# Patient Record
Sex: MALE | Race: WHITE
[De-identification: names, ages, dates, MRNs, and addresses within clinical notes are randomized per-mention and may not be internally consistent; named-entity substitution may affect disease eponyms.]

---

## 2017-06-19 ENCOUNTER — HOSPITAL ENCOUNTER (OUTPATIENT)
Dept: HOSPITAL 61 - PCVCCLINIC | Age: 82
Discharge: HOME | End: 2017-06-19
Attending: INTERNAL MEDICINE
Payer: MEDICARE

## 2017-06-19 DIAGNOSIS — Z87.891: ICD-10-CM

## 2017-06-19 DIAGNOSIS — E78.5: ICD-10-CM

## 2017-06-19 DIAGNOSIS — Z79.82: ICD-10-CM

## 2017-06-19 DIAGNOSIS — I10: ICD-10-CM

## 2017-06-19 DIAGNOSIS — I77.89: ICD-10-CM

## 2017-06-19 DIAGNOSIS — I25.10: Primary | ICD-10-CM

## 2017-06-19 DIAGNOSIS — R00.1: ICD-10-CM

## 2017-06-19 DIAGNOSIS — Z79.899: ICD-10-CM

## 2017-06-19 DIAGNOSIS — Z88.1: ICD-10-CM

## 2017-06-19 PROCEDURE — G0463 HOSPITAL OUTPT CLINIC VISIT: HCPCS

## 2017-06-19 PROCEDURE — 93005 ELECTROCARDIOGRAM TRACING: CPT

## 2017-06-19 PROCEDURE — 80061 LIPID PANEL: CPT

## 2017-12-18 ENCOUNTER — HOSPITAL ENCOUNTER (OUTPATIENT)
Dept: HOSPITAL 61 - PCVCIMAG | Age: 82
Discharge: HOME | End: 2017-12-18
Attending: INTERNAL MEDICINE
Payer: MEDICARE

## 2017-12-18 DIAGNOSIS — I25.10: ICD-10-CM

## 2017-12-18 DIAGNOSIS — Z79.899: ICD-10-CM

## 2017-12-18 DIAGNOSIS — I10: ICD-10-CM

## 2017-12-18 DIAGNOSIS — I48.91: Primary | ICD-10-CM

## 2017-12-18 DIAGNOSIS — Z87.891: ICD-10-CM

## 2017-12-18 DIAGNOSIS — E78.00: ICD-10-CM

## 2017-12-18 PROCEDURE — 93306 TTE W/DOPPLER COMPLETE: CPT

## 2017-12-18 PROCEDURE — 36415 COLL VENOUS BLD VENIPUNCTURE: CPT

## 2017-12-18 PROCEDURE — 93880 EXTRACRANIAL BILAT STUDY: CPT

## 2017-12-18 NOTE — PCVCIMAG
--------------- APPROVED REPORT --------------





Patient Location: Out-Patient



Indications

Stenosis



Doppler Spectral Velocity Analysis

PSV  /  EDVPSV  /  EDV

ECA (R)     68 / 7 cm/sECA (L)     68 / 9 cm/s



dICA (R)    54 / 19 cm/sdICA (L)     62 / 19 cm/s

[place]ver (R)     70 / 30 cm/smICA (L)     73 / 20 cm/s

pICA (R)     60 / 16 cm/spICA (L)     55 / 22 cm/s



Bulb (R)        53 / 13 cm/sBulb (L)         37 / 8 cm/s



dCCA (R)     48 / 17 cm/sdCCA (L)     50 / 13 cm/s

mCCA (R)    67 / 20 cm/smCCA (L)    66 / 9 cm/s



Vert (R)     43 / 12 cm/sVert (L)     34 / 8 cm/s

ICA/CCA     1.46

ICA/CCA     1.46





Findings

The right carotid bulb has mild calcified plaque.

The right proximal internal carotid artery shows no significant 

stenosis.

The right common carotid artery shows no significant stenosis.

The right external carotid artery shows no significant stenosis.

The left carotid bulb has mild calcified plaque.

The left proximal internal carotid artery shows no significant 

stenosis.

The left common carotid artery shows no significant stenosis.

The left external carotid artery shows no significant 

stenosis.



Conclusion

1.  Mild calcific plaquing involving both carotid arteries without 

significant stenosis

2.  Antegrade vertebral flow

## 2017-12-19 ENCOUNTER — HOSPITAL ENCOUNTER (OUTPATIENT)
Dept: HOSPITAL 61 - PCVCCLINIC | Age: 82
Discharge: HOME | End: 2017-12-19
Attending: INTERNAL MEDICINE
Payer: MEDICARE

## 2017-12-19 DIAGNOSIS — I65.23: ICD-10-CM

## 2017-12-19 DIAGNOSIS — Z79.899: ICD-10-CM

## 2017-12-19 DIAGNOSIS — I10: ICD-10-CM

## 2017-12-19 DIAGNOSIS — Z87.891: ICD-10-CM

## 2017-12-19 DIAGNOSIS — I48.91: Primary | ICD-10-CM

## 2017-12-19 DIAGNOSIS — I25.10: ICD-10-CM

## 2017-12-19 DIAGNOSIS — E78.00: ICD-10-CM

## 2017-12-19 PROCEDURE — G0463 HOSPITAL OUTPT CLINIC VISIT: HCPCS

## 2017-12-19 PROCEDURE — 93005 ELECTROCARDIOGRAM TRACING: CPT

## 2017-12-19 PROCEDURE — 80061 LIPID PANEL: CPT

## 2017-12-19 NOTE — PCVCIMAG
--------------- APPROVED REPORT --------------





Study performed:  12/18/2017 10:50:40



EXAM: Comprehensive 2D, Doppler, and color-flow 

Echocardiogram

Patient Location: Echo lab

Status:  routine



BSA:         1.90

HR: 65 bpmBP:          140/78 mmHg

Rhythm: Atrial Fibrillation



Other Information 

Study Quality: Adequate



Risk Factors: 

Cardiac Risk Factors:  HTN, Hyperlipidemia



Indications

Atrial Fibrillation

CAD

Hypertension/HDD



2D Dimensions

LVEF(%):  50.68 (&gt;50%)

IVSd:  12.26 (7-11mm)LVOT Diam:  17.63 (18-24mm) 

LVDd:  41.61 mm

PWd:  11.22 (7-11mm)Ascending Ao:  28.64 (22-36mm)

LVDs:  31.00 (25-40mm)

Left Atrium:  41.69 (27-40mm)

Aortic Root:  28.95 mm

LV Single Plane 4CH:  50.44 %

LV Single Plane 2CH:  47.76 %Rooney's LVEF:  49.10 %

Biplane EF:  50.2 %



Volumes

Left Atrial Volume (Systole)

Single Plane 4CH:  44.96 mLSingle Plane 2CH:  42.88 mL

LA ESV Index:  32.00 mL/m2



Aortic Valve

AoV Peak Andrea.:  1.17 m/s

AO Peak Gr.:  5.96 mmHgLVOT Max PG:  3.47 mmHg

LVOT Max V:  0.90 m/s

KASANDRA Vmax: 1.89 cm2

AI Vmax:  4.48 m/s

AI [place]:  2.17 m/s2

AI PHT:  634.89 ms



Pulmonary Valve

PV Peak Andrea.:  1.00 m/sPV Peak Gr.:  3.99 mmHg



Tricuspid Valve

TR Peak Andrea.:  2.35 m/sRAP Estimate:  7.00 mmHg

TR Peak Gr.:  22.15 mmHg

PA Pressure:  29.00 mmHg



Left Ventricle

The left ventricle is normal size. There is normal LV segmental wall 

motion. Mild concentric left ventricular hypertrophy. Left 

ventricular systolic function is mildly decreased. LVEF is 50%. This 

study is not technically sufficient to allow evaluation of the LV 

diastolic function due to atrial fibrillation.



Right Ventricle

The right ventricle is normal size. The right ventricular systolic 

function is normal.



Atria

Left atrium is mildly dilated. Right atrium is dilated.



Aortic Valve

The aortic valve is normal in structure. Trace to mild aortic 

regurgitation. There is no aortic valvular stenosis.



Mitral Valve

The mitral valve is normal in structure. Trace mitral regurgitation. 

No evidence of mitral valve stenosis.



Tricuspid Valve

The tricuspid valve is normal in structure. Trace to mild tricuspid 

regurgitation. Pulmonary artery pressure is 29 mmHg.



Pulmonic Valve

The pulmonary valve is normal in structure. There is no pulmonic 

valvular regurgitation.



Great Vessels

The aortic root is normal in size. IVC is normal in size and 

collapses with &gt;50% inspiration



Pericardium

There is no pericardial effusion.



&lt;Conclusion&gt;

The left ventricle is normal size.

Mild concentric left ventricular hypertrophy.

Left ventricular systolic function is mildly decreased.

LVEF is 50%.

This study is not technically sufficient to allow evaluation of the 

LV diastolic function due to atrial fibrillation.

Left atrium is mildly dilated.

Right atrium is dilated.

The aortic valve is normal in structure.

Trace mitral regurgitation.

Trace to mild tricuspid regurgitation. Pulmonary artery pressure is 

29 mmHg.

There is no pericardial effusion.

## 2018-01-16 ENCOUNTER — HOSPITAL ENCOUNTER (OUTPATIENT)
Dept: HOSPITAL 61 - PCVCIMAG | Age: 83
Discharge: HOME | End: 2018-01-16
Attending: INTERNAL MEDICINE
Payer: MEDICARE

## 2018-01-16 DIAGNOSIS — I10: ICD-10-CM

## 2018-01-16 DIAGNOSIS — Z79.899: ICD-10-CM

## 2018-01-16 DIAGNOSIS — Z87.891: ICD-10-CM

## 2018-01-16 DIAGNOSIS — I25.10: Primary | ICD-10-CM

## 2018-01-16 DIAGNOSIS — I48.91: ICD-10-CM

## 2018-01-16 DIAGNOSIS — E78.00: ICD-10-CM

## 2018-01-16 PROCEDURE — 93017 CV STRESS TEST TRACING ONLY: CPT

## 2018-01-16 PROCEDURE — 93005 ELECTROCARDIOGRAM TRACING: CPT

## 2018-01-16 PROCEDURE — 78452 HT MUSCLE IMAGE SPECT MULT: CPT

## 2018-04-26 ENCOUNTER — HOSPITAL ENCOUNTER (OUTPATIENT)
Dept: HOSPITAL 61 - PCVCIMAG | Age: 83
Discharge: HOME | End: 2018-04-26
Attending: INTERNAL MEDICINE
Payer: MEDICARE

## 2018-04-26 ENCOUNTER — HOSPITAL ENCOUNTER (OUTPATIENT)
Dept: HOSPITAL 61 - PCVCCLINIC | Age: 83
Discharge: HOME | End: 2018-04-26
Attending: INTERNAL MEDICINE
Payer: MEDICARE

## 2018-04-26 DIAGNOSIS — I48.1: ICD-10-CM

## 2018-04-26 DIAGNOSIS — R94.31: ICD-10-CM

## 2018-04-26 DIAGNOSIS — E11.65: ICD-10-CM

## 2018-04-26 DIAGNOSIS — I10: Primary | ICD-10-CM

## 2018-04-26 DIAGNOSIS — I08.0: Primary | ICD-10-CM

## 2018-04-26 DIAGNOSIS — R00.2: ICD-10-CM

## 2018-04-26 DIAGNOSIS — I25.10: ICD-10-CM

## 2018-04-26 DIAGNOSIS — E78.00: ICD-10-CM

## 2018-04-26 DIAGNOSIS — R06.02: ICD-10-CM

## 2018-04-26 DIAGNOSIS — K29.61: ICD-10-CM

## 2018-04-26 DIAGNOSIS — I10: ICD-10-CM

## 2018-04-26 DIAGNOSIS — Z79.899: ICD-10-CM

## 2018-04-26 DIAGNOSIS — Z87.891: ICD-10-CM

## 2018-04-26 PROCEDURE — 80061 LIPID PANEL: CPT

## 2018-04-26 PROCEDURE — 36415 COLL VENOUS BLD VENIPUNCTURE: CPT

## 2018-04-26 PROCEDURE — 93005 ELECTROCARDIOGRAM TRACING: CPT

## 2018-04-26 PROCEDURE — 93306 TTE W/DOPPLER COMPLETE: CPT

## 2018-05-04 ENCOUNTER — HOSPITAL ENCOUNTER (INPATIENT)
Dept: HOSPITAL 35 - ER | Age: 83
LOS: 4 days | Discharge: HOME | DRG: 69 | End: 2018-05-08
Attending: INTERNAL MEDICINE | Admitting: INTERNAL MEDICINE
Payer: COMMERCIAL

## 2018-05-04 VITALS — SYSTOLIC BLOOD PRESSURE: 147 MMHG | DIASTOLIC BLOOD PRESSURE: 76 MMHG

## 2018-05-04 VITALS — HEIGHT: 69.02 IN | WEIGHT: 160 LBS | BODY MASS INDEX: 23.7 KG/M2

## 2018-05-04 VITALS — SYSTOLIC BLOOD PRESSURE: 144 MMHG | DIASTOLIC BLOOD PRESSURE: 70 MMHG

## 2018-05-04 VITALS — DIASTOLIC BLOOD PRESSURE: 74 MMHG | SYSTOLIC BLOOD PRESSURE: 140 MMHG

## 2018-05-04 VITALS — DIASTOLIC BLOOD PRESSURE: 77 MMHG | SYSTOLIC BLOOD PRESSURE: 140 MMHG

## 2018-05-04 VITALS — SYSTOLIC BLOOD PRESSURE: 148 MMHG | DIASTOLIC BLOOD PRESSURE: 79 MMHG

## 2018-05-04 DIAGNOSIS — I25.10: ICD-10-CM

## 2018-05-04 DIAGNOSIS — Z95.5: ICD-10-CM

## 2018-05-04 DIAGNOSIS — K57.90: ICD-10-CM

## 2018-05-04 DIAGNOSIS — I48.91: ICD-10-CM

## 2018-05-04 DIAGNOSIS — Z88.5: ICD-10-CM

## 2018-05-04 DIAGNOSIS — N17.0: ICD-10-CM

## 2018-05-04 DIAGNOSIS — I25.2: ICD-10-CM

## 2018-05-04 DIAGNOSIS — Z96.642: ICD-10-CM

## 2018-05-04 DIAGNOSIS — G45.9: Primary | ICD-10-CM

## 2018-05-04 DIAGNOSIS — E78.00: ICD-10-CM

## 2018-05-04 DIAGNOSIS — D50.0: ICD-10-CM

## 2018-05-04 DIAGNOSIS — I10: ICD-10-CM

## 2018-05-04 DIAGNOSIS — Z98.42: ICD-10-CM

## 2018-05-04 DIAGNOSIS — Z87.891: ICD-10-CM

## 2018-05-04 DIAGNOSIS — K80.20: ICD-10-CM

## 2018-05-04 DIAGNOSIS — Z79.899: ICD-10-CM

## 2018-05-04 LAB
ALBUMIN SERPL-MCNC: 3.5 G/DL (ref 3.4–5)
ALT SERPL-CCNC: 22 U/L (ref 30–65)
ANION GAP SERPL CALC-SCNC: 11 MMOL/L (ref 7–16)
APTT BLD: 21.4 SECONDS (ref 24.5–32.8)
AST SERPL-CCNC: 23 U/L (ref 15–37)
BASOPHILS NFR BLD AUTO: 1.4 % (ref 0–2)
BILIRUB SERPL-MCNC: 0.6 MG/DL
BILIRUB UR-MCNC: NEGATIVE MG/DL
BUN SERPL-MCNC: 22 MG/DL (ref 7–18)
CALCIUM SERPL-MCNC: 8.8 MG/DL (ref 8.5–10.1)
CHLORIDE SERPL-SCNC: 104 MMOL/L (ref 98–107)
CHOLEST SERPL-MCNC: 120 MG/DL (ref ?–200)
CO2 SERPL-SCNC: 21 MMOL/L (ref 21–32)
COLOR UR: YELLOW
CREAT SERPL-MCNC: 1.6 MG/DL (ref 0.7–1.3)
EOSINOPHIL NFR BLD: 3.5 % (ref 0–3)
ERYTHROCYTE [DISTWIDTH] IN BLOOD BY AUTOMATED COUNT: 16.4 % (ref 10.5–14.5)
GLUCOSE SERPL-MCNC: 218 MG/DL (ref 74–106)
GRANULOCYTES NFR BLD MANUAL: 60.5 % (ref 36–66)
HCT VFR BLD CALC: 25.5 % (ref 42–52)
HDLC SERPL-MCNC: 48 MG/DL (ref 40–?)
HGB BLD-MCNC: 8.6 GM/DL (ref 14–18)
INR PPP: 1
KETONES UR STRIP-MCNC: (no result) MG/DL
LDLC SERPL-MCNC: 61 MG/DL (ref ?–100)
LYMPHOCYTES NFR BLD AUTO: 24.5 % (ref 24–44)
MCH RBC QN AUTO: 30.3 PG (ref 26–34)
MCHC RBC AUTO-ENTMCNC: 33.9 G/DL (ref 28–37)
MCV RBC: 89.4 FL (ref 80–100)
MONOCYTES NFR BLD: 10.1 % (ref 1–8)
NEUTROPHILS # BLD: 4.7 THOU/UL (ref 1.4–8.2)
PLATELET # BLD: 297 THOU/UL (ref 150–400)
POTASSIUM SERPL-SCNC: 3.6 MMOL/L (ref 3.5–5.1)
PROT SERPL-MCNC: 6.2 G/DL (ref 6.4–8.2)
PROTHROMBIN TIME: 10.3 SECONDS (ref 9.3–11.4)
RBC # BLD AUTO: 2.85 MIL/UL (ref 4.5–6)
RBC # UR STRIP: NEGATIVE /UL
SODIUM SERPL-SCNC: 136 MMOL/L (ref 136–145)
SP GR UR STRIP: 1.02 (ref 1–1.03)
TC:HDL: 2.5 RATIO
TRIGL SERPL-MCNC: 58 MG/DL (ref ?–150)
TROPONIN I SERPL-MCNC: < 0.04 NG/ML (ref ?–0.06)
URINE CLARITY: CLEAR
URINE GLUCOSE-RANDOM*: (no result)
URINE LEUKOCYTES-REFLEX: NEGATIVE
URINE NITRITE-REFLEX: NEGATIVE
URINE PROTEIN (DIPSTICK): NEGATIVE
UROBILINOGEN UR STRIP-ACNC: 0.2 E.U./DL (ref 0.2–1)
VLDLC SERPL CALC-MCNC: 12 MG/DL (ref ?–40)
WBC # BLD AUTO: 7.7 THOU/UL (ref 4–11)

## 2018-05-04 PROCEDURE — 10879: CPT

## 2018-05-04 PROCEDURE — 10183: CPT

## 2018-05-05 VITALS — SYSTOLIC BLOOD PRESSURE: 127 MMHG | DIASTOLIC BLOOD PRESSURE: 67 MMHG

## 2018-05-05 VITALS — SYSTOLIC BLOOD PRESSURE: 140 MMHG | DIASTOLIC BLOOD PRESSURE: 76 MMHG

## 2018-05-05 VITALS — SYSTOLIC BLOOD PRESSURE: 133 MMHG | DIASTOLIC BLOOD PRESSURE: 71 MMHG

## 2018-05-05 VITALS — DIASTOLIC BLOOD PRESSURE: 61 MMHG | SYSTOLIC BLOOD PRESSURE: 128 MMHG

## 2018-05-05 VITALS — SYSTOLIC BLOOD PRESSURE: 130 MMHG | DIASTOLIC BLOOD PRESSURE: 65 MMHG

## 2018-05-05 LAB
ABSOLUTE RETIC COUNT: 0.08 10^6/UL
ALBUMIN SERPL-MCNC: 3.1 G/DL (ref 3.4–5)
ANION GAP SERPL CALC-SCNC: 5 MMOL/L (ref 7–16)
BUN SERPL-MCNC: 17 MG/DL (ref 7–18)
CALCIUM SERPL-MCNC: 8.2 MG/DL (ref 8.5–10.1)
CHLORIDE SERPL-SCNC: 108 MMOL/L (ref 98–107)
CO2 SERPL-SCNC: 26 MMOL/L (ref 21–32)
CREAT SERPL-MCNC: 1.3 MG/DL (ref 0.7–1.3)
ERYTHROCYTE [DISTWIDTH] IN BLOOD BY AUTOMATED COUNT: 16.1 % (ref 10.5–14.5)
GLUCOSE SERPL-MCNC: 202 MG/DL (ref 74–106)
HCT VFR BLD CALC: 24.2 % (ref 42–52)
HGB BLD-MCNC: 7.9 GM/DL (ref 14–18)
IRON SERPL-MCNC: 15 UG/DL (ref 65–175)
MCH RBC QN AUTO: 29 PG (ref 26–34)
MCHC RBC AUTO-ENTMCNC: 32.5 G/DL (ref 28–37)
MCV RBC: 89.2 FL (ref 80–100)
OBSERVED RETIC COUNT: 2.88 % (ref 0.6–2.6)
PHOSPHATE SERPL-MCNC: 3.4 MG/DL (ref 2.5–4.9)
PLATELET # BLD: 249 THOU/UL (ref 150–400)
POTASSIUM SERPL-SCNC: 4 MMOL/L (ref 3.5–5.1)
RBC # BLD AUTO: 2.71 MIL/UL (ref 4.5–6)
SAO2 % BLD FROM PO2: 5 % (ref 20–39)
SODIUM SERPL-SCNC: 139 MMOL/L (ref 136–145)
TIBC SERPL-MCNC: 308 UG/DL (ref 250–450)
TSH SERPL-ACNC: 0.5 UIU/ML (ref 0.36–3.74)
VIT B12 SERPL-MCNC: 344 PG/ML (ref 193–986)
WBC # BLD AUTO: 5.9 THOU/UL (ref 4–11)

## 2018-05-06 VITALS — DIASTOLIC BLOOD PRESSURE: 68 MMHG | SYSTOLIC BLOOD PRESSURE: 133 MMHG

## 2018-05-06 VITALS — DIASTOLIC BLOOD PRESSURE: 78 MMHG | SYSTOLIC BLOOD PRESSURE: 142 MMHG

## 2018-05-06 VITALS — SYSTOLIC BLOOD PRESSURE: 122 MMHG | DIASTOLIC BLOOD PRESSURE: 62 MMHG

## 2018-05-06 VITALS — DIASTOLIC BLOOD PRESSURE: 77 MMHG | SYSTOLIC BLOOD PRESSURE: 131 MMHG

## 2018-05-06 VITALS — SYSTOLIC BLOOD PRESSURE: 105 MMHG | DIASTOLIC BLOOD PRESSURE: 59 MMHG

## 2018-05-06 LAB
ERYTHROCYTE [DISTWIDTH] IN BLOOD BY AUTOMATED COUNT: 16.8 % (ref 10.5–14.5)
HCT VFR BLD CALC: 21.1 % (ref 42–52)
HGB BLD-MCNC: 7.1 GM/DL (ref 14–18)
MCH RBC QN AUTO: 29.4 PG (ref 26–34)
MCHC RBC AUTO-ENTMCNC: 33.5 G/DL (ref 28–37)
MCV RBC: 87.7 FL (ref 80–100)
PLATELET # BLD: 260 THOU/UL (ref 150–400)
RBC # BLD AUTO: 2.4 MIL/UL (ref 4.5–6)
WBC # BLD AUTO: 8.7 THOU/UL (ref 4–11)

## 2018-05-07 VITALS — DIASTOLIC BLOOD PRESSURE: 75 MMHG | SYSTOLIC BLOOD PRESSURE: 130 MMHG

## 2018-05-07 VITALS — SYSTOLIC BLOOD PRESSURE: 139 MMHG | DIASTOLIC BLOOD PRESSURE: 74 MMHG

## 2018-05-07 VITALS — DIASTOLIC BLOOD PRESSURE: 87 MMHG | SYSTOLIC BLOOD PRESSURE: 137 MMHG

## 2018-05-07 VITALS — SYSTOLIC BLOOD PRESSURE: 141 MMHG | DIASTOLIC BLOOD PRESSURE: 67 MMHG

## 2018-05-07 VITALS — SYSTOLIC BLOOD PRESSURE: 170 MMHG | DIASTOLIC BLOOD PRESSURE: 73 MMHG

## 2018-05-07 VITALS — DIASTOLIC BLOOD PRESSURE: 80 MMHG | SYSTOLIC BLOOD PRESSURE: 144 MMHG

## 2018-05-07 VITALS — DIASTOLIC BLOOD PRESSURE: 83 MMHG | SYSTOLIC BLOOD PRESSURE: 145 MMHG

## 2018-05-07 VITALS — SYSTOLIC BLOOD PRESSURE: 145 MMHG | DIASTOLIC BLOOD PRESSURE: 83 MMHG

## 2018-05-07 LAB
ANION GAP SERPL CALC-SCNC: 7 MMOL/L (ref 7–16)
BASOPHILS NFR BLD AUTO: 0.8 % (ref 0–2)
BUN SERPL-MCNC: 15 MG/DL (ref 7–18)
CALCIUM SERPL-MCNC: 7.9 MG/DL (ref 8.5–10.1)
CHLORIDE SERPL-SCNC: 109 MMOL/L (ref 98–107)
CO2 SERPL-SCNC: 24 MMOL/L (ref 21–32)
CREAT SERPL-MCNC: 1.2 MG/DL (ref 0.7–1.3)
EOSINOPHIL NFR BLD: 2.8 % (ref 0–3)
ERYTHROCYTE [DISTWIDTH] IN BLOOD BY AUTOMATED COUNT: 16.8 % (ref 10.5–14.5)
GLUCOSE SERPL-MCNC: 128 MG/DL (ref 74–106)
GRANULOCYTES NFR BLD MANUAL: 72.4 % (ref 36–66)
HCT VFR BLD CALC: 21.2 % (ref 42–52)
HGB BLD-MCNC: 7 GM/DL (ref 14–18)
LYMPHOCYTES NFR BLD AUTO: 15.8 % (ref 24–44)
MCH RBC QN AUTO: 29.2 PG (ref 26–34)
MCHC RBC AUTO-ENTMCNC: 33 G/DL (ref 28–37)
MCV RBC: 88.4 FL (ref 80–100)
MONOCYTES NFR BLD: 8.2 % (ref 1–8)
NEUTROPHILS # BLD: 5.5 THOU/UL (ref 1.4–8.2)
PLATELET # BLD: 239 THOU/UL (ref 150–400)
POTASSIUM SERPL-SCNC: 3.7 MMOL/L (ref 3.5–5.1)
RBC # BLD AUTO: 2.39 MIL/UL (ref 4.5–6)
SODIUM SERPL-SCNC: 140 MMOL/L (ref 136–145)
WBC # BLD AUTO: 7.6 THOU/UL (ref 4–11)

## 2018-05-07 PROCEDURE — 30233N1 TRANSFUSION OF NONAUTOLOGOUS RED BLOOD CELLS INTO PERIPHERAL VEIN, PERCUTANEOUS APPROACH: ICD-10-PCS | Performed by: FAMILY MEDICINE

## 2018-05-08 VITALS — DIASTOLIC BLOOD PRESSURE: 85 MMHG | SYSTOLIC BLOOD PRESSURE: 155 MMHG

## 2018-05-08 VITALS — SYSTOLIC BLOOD PRESSURE: 155 MMHG | DIASTOLIC BLOOD PRESSURE: 85 MMHG

## 2018-05-08 VITALS — DIASTOLIC BLOOD PRESSURE: 84 MMHG | SYSTOLIC BLOOD PRESSURE: 156 MMHG

## 2018-05-08 LAB
ANION GAP SERPL CALC-SCNC: 9 MMOL/L (ref 7–16)
BASOPHILS NFR BLD AUTO: 0.8 % (ref 0–2)
BUN SERPL-MCNC: 14 MG/DL (ref 7–18)
CALCIUM SERPL-MCNC: 8.4 MG/DL (ref 8.5–10.1)
CHLORIDE SERPL-SCNC: 108 MMOL/L (ref 98–107)
CO2 SERPL-SCNC: 24 MMOL/L (ref 21–32)
CREAT SERPL-MCNC: 1.2 MG/DL (ref 0.7–1.3)
EOSINOPHIL NFR BLD: 4.6 % (ref 0–3)
ERYTHROCYTE [DISTWIDTH] IN BLOOD BY AUTOMATED COUNT: 18 % (ref 10.5–14.5)
GLUCOSE SERPL-MCNC: 133 MG/DL (ref 74–106)
GRANULOCYTES NFR BLD MANUAL: 66.9 % (ref 36–66)
HCT VFR BLD CALC: 24.4 % (ref 42–52)
HGB BLD-MCNC: 8.2 GM/DL (ref 14–18)
LYMPHOCYTES NFR BLD AUTO: 18.8 % (ref 24–44)
MCH RBC QN AUTO: 28.8 PG (ref 26–34)
MCHC RBC AUTO-ENTMCNC: 33.6 G/DL (ref 28–37)
MCV RBC: 85.6 FL (ref 80–100)
MONOCYTES NFR BLD: 8.9 % (ref 1–8)
NEUTROPHILS # BLD: 4.8 THOU/UL (ref 1.4–8.2)
PLATELET # BLD: 237 THOU/UL (ref 150–400)
POTASSIUM SERPL-SCNC: 3.7 MMOL/L (ref 3.5–5.1)
RBC # BLD AUTO: 2.85 MIL/UL (ref 4.5–6)
SODIUM SERPL-SCNC: 141 MMOL/L (ref 136–145)
WBC # BLD AUTO: 7.2 THOU/UL (ref 4–11)

## 2018-07-23 ENCOUNTER — HOSPITAL ENCOUNTER (OUTPATIENT)
Dept: HOSPITAL 61 - PCVCCLINIC | Age: 83
Discharge: HOME | End: 2018-07-23
Attending: INTERNAL MEDICINE
Payer: MEDICARE

## 2018-07-23 DIAGNOSIS — D64.9: ICD-10-CM

## 2018-07-23 DIAGNOSIS — Z79.899: ICD-10-CM

## 2018-07-23 DIAGNOSIS — I25.10: Primary | ICD-10-CM

## 2018-07-23 DIAGNOSIS — Z87.891: ICD-10-CM

## 2018-07-23 DIAGNOSIS — I77.9: ICD-10-CM

## 2018-07-23 DIAGNOSIS — I10: ICD-10-CM

## 2018-07-23 DIAGNOSIS — E78.00: ICD-10-CM

## 2018-07-23 DIAGNOSIS — I48.2: ICD-10-CM

## 2018-07-23 PROCEDURE — 80061 LIPID PANEL: CPT

## 2018-07-23 PROCEDURE — 93005 ELECTROCARDIOGRAM TRACING: CPT

## 2018-11-27 ENCOUNTER — HOSPITAL ENCOUNTER (OUTPATIENT)
Dept: HOSPITAL 61 - PCVCIMAG | Age: 83
Discharge: HOME | End: 2018-11-27
Attending: INTERNAL MEDICINE
Payer: MEDICARE

## 2018-11-27 DIAGNOSIS — Z87.891: ICD-10-CM

## 2018-11-27 DIAGNOSIS — R94.31: ICD-10-CM

## 2018-11-27 DIAGNOSIS — Z72.89: ICD-10-CM

## 2018-11-27 DIAGNOSIS — I65.23: ICD-10-CM

## 2018-11-27 DIAGNOSIS — I25.10: ICD-10-CM

## 2018-11-27 DIAGNOSIS — Z88.2: ICD-10-CM

## 2018-11-27 DIAGNOSIS — E78.00: ICD-10-CM

## 2018-11-27 DIAGNOSIS — I08.3: Primary | ICD-10-CM

## 2018-11-27 DIAGNOSIS — I10: ICD-10-CM

## 2018-11-27 DIAGNOSIS — I48.1: ICD-10-CM

## 2018-11-27 PROCEDURE — 93005 ELECTROCARDIOGRAM TRACING: CPT

## 2018-11-27 PROCEDURE — G0463 HOSPITAL OUTPT CLINIC VISIT: HCPCS

## 2018-11-27 PROCEDURE — 93306 TTE W/DOPPLER COMPLETE: CPT

## 2018-11-27 NOTE — PCVCIMAG
--------------- APPROVED REPORT --------------





Study performed:  11/27/2018 14:04:17



EXAM: Comprehensive 2D, Doppler, and color-flow 

Echocardiogram

Patient Location: Echo lab

Status:  routine



BSA:         1.88

HR: 75 bpmBP:          126/82 mmHg

Rhythm: Atrial Fibrillation



Other Information 

Study Quality: Good



Risk Factors: 

Cardiac Risk Factors:  Hyperlipidemia, HTN



Indications

Atrial Fibrillation

CAD



2D Dimensions

IVSd:  10.55 (7-11mm)LVOT Diam:  19.00 (18-24mm) 

LVDd:  42.66 mm

PWd:  11.58 (7-11mm)Ascending Ao:  31.62 (22-36mm)

LVDs:  31.87 (25-40mm)

Left Atrium:  35.90 (27-40mm)

Aortic Root:  29.61 mm

LV Single Plane 4CH:  45.91 %

LV Single Plane 2CH:  45.55 %



Volumes

Left Atrial Volume (Systole)

Single Plane 4CH:  62.54 mLSingle Plane 2CH:  61.28 mL

LA ESV Index:  36.00 mL/m2



Aortic Valve

AoV Peak Andrea.:  1.24 m/s

AO Peak Gr.:  6.81 mmHgLVOT Max PG:  3.65 mmHg

LVOT Max V:  0.96 m/s

KASANDRA Vmax: 2.26 cm2

AI Vmax:  4.47 m/s

AI [place]:  2.61 m/s2

AI PHT:  497.79 ms



Pulmonary Valve

PV Peak Andrea.:  0.91 m/sPV Peak Gr.:  3.33 mmHg



Tricuspid Valve

TR Peak Andrea.:  2.53 m/sRAP Estimate:  7.00 mmHg

TR Peak Gr.:  25.57 mmHg

PA Pressure:  33.00 mmHg



Left Ventricle

The left ventricle is normal size. Mild concentric left ventricular 

hypertrophy. Left ventricular systolic function is mildly decreased. 

LVEF is 45-50%. This study is not technically sufficient to allow 

evaluation of the LV diastolic function due to atrial 

fibrillation.



Right Ventricle

The right ventricle is normal size. The right ventricular systolic 

function is normal.



Atria

Left atrium is dilated. Right atrium is dilated.



Aortic Valve

The Aortic valve is sclerotic. Mild to moderate aortic regurgitation. 

There is no aortic valvular stenosis.



Mitral Valve

There is mitral annular calcification. Mild mitral regurgitation. No 

evidence of mitral valve stenosis.



Tricuspid Valve

The tricuspid valve is normal in structure. Mild to moderate 

tricuspid regurgitation. Pulmonary artery pressure is 33 

mmHg.



Pulmonic Valve

The pulmonary valve is normal in structure. There is no pulmonic 

valvular regurgitation.



Great Vessels

The aortic root is normal in size. IVC is normal in size and 

collapses &gt;50% with inspiration.



Pericardium

There is no pericardial effusion.



&lt;Conclusion&gt;

The left ventricle is normal size.

Mild concentric left ventricular hypertrophy.

Left ventricular systolic function is mildly decreased.

LVEF is 45-50%.

This study is not technically sufficient to allow evaluation of the 

LV diastolic function due to atrial fibrillation.

The right ventricle is normal size.

Left atrium is dilated.

Right atrium is dilated.

Right atrium is dilated.

The Aortic valve is sclerotic.

Mild to moderate aortic regurgitation.

Mild mitral regurgitation.

Mild to moderate tricuspid regurgitation. Pulmonary artery pressure 

is 33 mmHg.

The aortic root is normal in size.

There is no pericardial effusion.

## 2019-05-31 ENCOUNTER — HOSPITAL ENCOUNTER (OUTPATIENT)
Dept: HOSPITAL 61 - PCVCCLINIC | Age: 84
Discharge: HOME | End: 2019-05-31
Attending: INTERNAL MEDICINE
Payer: MEDICARE

## 2019-05-31 DIAGNOSIS — I48.2: ICD-10-CM

## 2019-05-31 DIAGNOSIS — Z87.891: ICD-10-CM

## 2019-05-31 DIAGNOSIS — I10: ICD-10-CM

## 2019-05-31 DIAGNOSIS — E78.00: ICD-10-CM

## 2019-05-31 DIAGNOSIS — Z79.899: ICD-10-CM

## 2019-05-31 DIAGNOSIS — I25.10: Primary | ICD-10-CM

## 2019-05-31 PROCEDURE — G0463 HOSPITAL OUTPT CLINIC VISIT: HCPCS

## 2019-05-31 PROCEDURE — 93005 ELECTROCARDIOGRAM TRACING: CPT

## 2020-11-18 ENCOUNTER — HOSPITAL ENCOUNTER (OUTPATIENT)
Dept: HOSPITAL 35 - SJCVCIMAG | Age: 85
End: 2020-11-18
Attending: INTERNAL MEDICINE
Payer: COMMERCIAL

## 2020-11-18 DIAGNOSIS — I48.91: ICD-10-CM

## 2020-11-18 DIAGNOSIS — I11.9: ICD-10-CM

## 2020-11-18 DIAGNOSIS — Z87.891: ICD-10-CM

## 2020-11-18 DIAGNOSIS — I08.3: Primary | ICD-10-CM

## 2020-11-18 DIAGNOSIS — R94.31: ICD-10-CM

## 2020-11-18 DIAGNOSIS — Z79.899: ICD-10-CM

## 2020-11-18 DIAGNOSIS — E78.00: ICD-10-CM

## 2021-01-07 ENCOUNTER — HOSPITAL ENCOUNTER (OUTPATIENT)
Dept: HOSPITAL 35 - RAD | Age: 86
End: 2021-01-07
Attending: INTERNAL MEDICINE
Payer: COMMERCIAL

## 2021-01-07 DIAGNOSIS — J90: Primary | ICD-10-CM

## 2021-01-11 ENCOUNTER — HOSPITAL ENCOUNTER (OUTPATIENT)
Dept: HOSPITAL 35 - ULTRA | Age: 86
End: 2021-01-11
Attending: INTERNAL MEDICINE
Payer: COMMERCIAL

## 2021-01-11 DIAGNOSIS — R59.9: ICD-10-CM

## 2021-01-11 DIAGNOSIS — Z98.890: ICD-10-CM

## 2021-01-11 DIAGNOSIS — J90: Primary | ICD-10-CM

## 2021-01-11 DIAGNOSIS — R91.8: ICD-10-CM

## 2021-01-11 DIAGNOSIS — Z79.899: ICD-10-CM

## 2021-01-11 LAB
ANION GAP SERPL CALC-SCNC: 8 MMOL/L (ref 7–16)
ANISOCYTOSIS BLD QL SMEAR: (no result)
APTT BLD: 29.9 SECONDS (ref 24.5–32.8)
BASOPHILS NFR BLD AUTO: 0.4 % (ref 0–2)
BF MACROPHAGE: 74 %
BF NEUTROPHILS: 4 %
BF NUCLEATED CELLS: 260 /MM3
BF RBC: 644 /MM3
BUN SERPL-MCNC: 14 MG/DL (ref 7–18)
CALCIUM SERPL-MCNC: 9 MG/DL (ref 8.5–10.1)
CHLORIDE SERPL-SCNC: 102 MMOL/L (ref 98–107)
CLARITY UR: CLEAR
CO2 SERPL-SCNC: 27 MMOL/L (ref 21–32)
COLOR UR: YELLOW
CREAT SERPL-MCNC: 1.1 MG/DL (ref 0.7–1.3)
EOSINOPHIL NFR BLD: 0.7 % (ref 0–3)
ERYTHROCYTE [DISTWIDTH] IN BLOOD BY AUTOMATED COUNT: 18.9 % (ref 10.5–14.5)
GLUCOSE SERPL-MCNC: 171 MG/DL (ref 74–106)
GRANULOCYTES NFR BLD MANUAL: 81.7 % (ref 36–66)
HCT VFR BLD CALC: 35.5 % (ref 42–52)
HGB BLD-MCNC: 11.7 GM/DL (ref 14–18)
INR PPP: 1.1
LYMPHOCYTES NFR BLD AUTO: 7.4 % (ref 24–44)
MCH RBC QN AUTO: 26.9 PG (ref 26–34)
MCHC RBC AUTO-ENTMCNC: 32.8 G/DL (ref 28–37)
MCV RBC: 82 FL (ref 80–100)
MONOCYTES NFR BLD: 9.8 % (ref 1–8)
NEUTROPHILS # BLD: 11.2 THOU/UL (ref 1.4–8.2)
OVALOCYTES BLD QL SMEAR: (no result)
PLATELET # BLD: 408 THOU/UL (ref 150–400)
POLYCHROMASIA BLD QL SMEAR: (no result)
POTASSIUM SERPL-SCNC: 3.4 MMOL/L (ref 3.5–5.1)
PROTHROMBIN TIME: 11.2 SECONDS (ref 9.3–11.4)
RBC # BLD AUTO: 4.33 MIL/UL (ref 4.5–6)
SODIUM SERPL-SCNC: 137 MMOL/L (ref 136–145)
SOURCE: (no result)
SPECIMEN VOL 24H UR: 60 ML
WBC # BLD AUTO: 13.7 THOU/UL (ref 4–11)

## 2021-01-12 LAB
ALBUMIN FLD-MCNC: 1.5 G/DL
AMYLASE FLD-CCNC: 26 U/L
BODY FLUID LDH: 162 IU/L
GLUCOSE FLD-MCNC: 165 MG/DL
PROT FLD-MCNC: 2.8 G/DL
SOURCE: (no result)

## 2021-01-13 NOTE — PATH
[facility]
[address]
[place]   [address]                     PATHOLOGY RPT PROCEDURE       
_______________________________________________________________________________
 
Name:       DEVON MAXWELL        Room #:                     REG ROCHELLE MORRISSEY.JENNY.#:      [id number]     Account #:      [id number]  
Admission:  01/11/21    Date of Birth:  [birth year]  
Discharge:                                              Report #:    [id number]
                                                        Path Case #: [id number]
_______________________________________________________________________________
Note
LCA Accession Number: [id number]
   TESTS               RESULT  FLAG  UNITS    REF RANGE  LAB
------------------------------------------------------------
   Clinician Provided Cytology Information
   No. of containers..01 Other (Miscellaneous)
Source:                                                   01
   RIGHT PLEURAL FLUID
DIAGNOSIS:                                                02
   RIGHT PLEURAL FLUID
   NEGATIVE FOR MALIGNANT EPITHELIAL CELLS.
   REACTIVE MESOTHELIAL CELLS ARE PRESENT.
   THIS INTERPRETATION INCLUDES EVALUATION OF A CELL BLOCK.
   CHRONIC INFLAMMATION AND MACROPHAGES.
   Comment:
   Few enlarged mildly atypical cells are identified in a background of
   inflammation and macrophages. Properly controlled immunohistochemical
   stains are performed on formalin fixed cell block. Calretinin and desmin
   are reactive within the mesothelial cells. CD45 is reactive within the
   inflammatory cells. CD68 is reactive within the scattered macrophages.
   BerEP4 is nonreactive. Findings support a reactive process.
Pathologist ICD10:                                        02
   J90
Signed out by:                                            02
   Edilma Farley MD, Pathologist
   NPI- [id number]
Performed by:                                             01
   Mattie Espinal, Cytotechnologist ([facility])
Gross description:                                        01
   10ML, HAZY YELLOW, 1 TP 1 CB
   /LCS  01/11/2021  1654 Local
 
------------------------------------------------------------
    FLAG LEGEND:
    L-Low Normal,H-High Normal,LL-Alert Low,HH-Alert High
    <-Panic Low,>-Panic High,A-Abnormal,AA-Critical Abnormal
------------------------------------------------------------
 
Performed at:
01 [facility]
   [address]
   [place]  [address]
   Saul Cervantes MD, Phone: [phone]
02 [address]  [address]
   Edilma Farley MD, Phone: [phone]
Specimen Comment: A courtesy copy of this report has been sent to [phone],
[phone]   [address]                     PATHOLOGY RPT PROCEDURE       
_______________________________________________________________________________
 
Name:       ALISSADEVON DAVION        Room #:                     REG ROCHELLE HUNT#:      [id number]     Account #:      [id number]  
Admission:  01/11/21    Date of Birth:  [birth year]  
Discharge:                                              Report #:    [id number]
                                                        Path Case #: [id number]
_______________________________________________________________________________
Specimen Comment: [phone]
Specimen Comment: Report sent to  / DR AVERY
***Performed at:  01
   [facility]
   [address] [place] [address] [address], [place], [place]  [id number]
   MD Saul Cervantes MD Phone:  [phone]

## 2021-01-18 ENCOUNTER — HOSPITAL ENCOUNTER (OUTPATIENT)
Dept: HOSPITAL 35 - RAD | Age: 86
End: 2021-01-18
Attending: INTERNAL MEDICINE
Payer: COMMERCIAL

## 2021-01-18 DIAGNOSIS — J90: Primary | ICD-10-CM

## 2021-01-25 ENCOUNTER — HOSPITAL ENCOUNTER (OUTPATIENT)
Dept: HOSPITAL 35 - LAB | Age: 86
End: 2021-01-25
Attending: INTERNAL MEDICINE
Payer: COMMERCIAL

## 2021-01-25 DIAGNOSIS — Z01.812: Primary | ICD-10-CM

## 2021-01-25 DIAGNOSIS — Z20.822: ICD-10-CM

## 2021-01-27 ENCOUNTER — HOSPITAL ENCOUNTER (OUTPATIENT)
Dept: HOSPITAL 35 - CAT | Age: 86
End: 2021-01-27
Attending: INTERNAL MEDICINE
Payer: COMMERCIAL

## 2021-01-27 DIAGNOSIS — J90: ICD-10-CM

## 2021-01-27 DIAGNOSIS — R91.8: Primary | ICD-10-CM

## 2021-01-28 ENCOUNTER — HOSPITAL ENCOUNTER (INPATIENT)
Dept: HOSPITAL 35 - PUL | Age: 86
LOS: 2 days | Discharge: HOSPICE | DRG: 166 | End: 2021-01-30
Attending: INTERNAL MEDICINE | Admitting: INTERNAL MEDICINE
Payer: COMMERCIAL

## 2021-01-28 VITALS — SYSTOLIC BLOOD PRESSURE: 119 MMHG | DIASTOLIC BLOOD PRESSURE: 80 MMHG

## 2021-01-28 VITALS — DIASTOLIC BLOOD PRESSURE: 67 MMHG | SYSTOLIC BLOOD PRESSURE: 139 MMHG

## 2021-01-28 VITALS — HEIGHT: 69.02 IN | BODY MASS INDEX: 19.05 KG/M2 | WEIGHT: 128.6 LBS

## 2021-01-28 VITALS — DIASTOLIC BLOOD PRESSURE: 59 MMHG | SYSTOLIC BLOOD PRESSURE: 119 MMHG

## 2021-01-28 DIAGNOSIS — I48.91: ICD-10-CM

## 2021-01-28 DIAGNOSIS — E78.00: ICD-10-CM

## 2021-01-28 DIAGNOSIS — E78.5: ICD-10-CM

## 2021-01-28 DIAGNOSIS — I25.2: ICD-10-CM

## 2021-01-28 DIAGNOSIS — J93.83: Primary | ICD-10-CM

## 2021-01-28 DIAGNOSIS — K57.90: ICD-10-CM

## 2021-01-28 DIAGNOSIS — Z95.5: ICD-10-CM

## 2021-01-28 DIAGNOSIS — I65.29: ICD-10-CM

## 2021-01-28 DIAGNOSIS — Z87.891: ICD-10-CM

## 2021-01-28 DIAGNOSIS — C34.11: ICD-10-CM

## 2021-01-28 DIAGNOSIS — Z98.49: ICD-10-CM

## 2021-01-28 DIAGNOSIS — Z96.642: ICD-10-CM

## 2021-01-28 DIAGNOSIS — E43: ICD-10-CM

## 2021-01-28 DIAGNOSIS — E11.9: ICD-10-CM

## 2021-01-28 DIAGNOSIS — Z88.2: ICD-10-CM

## 2021-01-28 DIAGNOSIS — Z98.42: ICD-10-CM

## 2021-01-28 DIAGNOSIS — J96.21: ICD-10-CM

## 2021-01-28 DIAGNOSIS — I10: ICD-10-CM

## 2021-01-28 DIAGNOSIS — K21.9: ICD-10-CM

## 2021-01-28 DIAGNOSIS — J45.909: ICD-10-CM

## 2021-01-28 DIAGNOSIS — I25.10: ICD-10-CM

## 2021-01-28 DIAGNOSIS — J44.9: ICD-10-CM

## 2021-01-28 DIAGNOSIS — J91.8: ICD-10-CM

## 2021-01-28 LAB
ANION GAP SERPL CALC-SCNC: 8 MMOL/L (ref 7–16)
BASOPHILS NFR BLD AUTO: 0.2 % (ref 0–2)
BUN SERPL-MCNC: 19 MG/DL (ref 7–18)
CALCIUM SERPL-MCNC: 8.4 MG/DL (ref 8.5–10.1)
CHLORIDE SERPL-SCNC: 102 MMOL/L (ref 98–107)
CO2 SERPL-SCNC: 29 MMOL/L (ref 21–32)
CREAT SERPL-MCNC: 1 MG/DL (ref 0.7–1.3)
EOSINOPHIL NFR BLD: 0.2 % (ref 0–3)
ERYTHROCYTE [DISTWIDTH] IN BLOOD BY AUTOMATED COUNT: 19 % (ref 10.5–14.5)
GLUCOSE SERPL-MCNC: 218 MG/DL (ref 74–106)
GRANULOCYTES NFR BLD MANUAL: 93.4 % (ref 36–66)
HCT VFR BLD CALC: 35.3 % (ref 42–52)
HGB BLD-MCNC: 11.2 GM/DL (ref 14–18)
LYMPHOCYTES NFR BLD AUTO: 4.3 % (ref 24–44)
MAGNESIUM SERPL-MCNC: 1.4 MG/DL (ref 1.8–2.4)
MCH RBC QN AUTO: 26.2 PG (ref 26–34)
MCHC RBC AUTO-ENTMCNC: 31.8 G/DL (ref 28–37)
MCV RBC: 82.2 FL (ref 80–100)
MONOCYTES NFR BLD: 1.9 % (ref 1–8)
NEUTROPHILS # BLD: 10.9 THOU/UL (ref 1.4–8.2)
PLATELET # BLD: 428 THOU/UL (ref 150–400)
POTASSIUM SERPL-SCNC: 4.1 MMOL/L (ref 3.5–5.1)
RBC # BLD AUTO: 4.3 MIL/UL (ref 4.5–6)
SODIUM SERPL-SCNC: 139 MMOL/L (ref 136–145)
WBC # BLD AUTO: 11.7 THOU/UL (ref 4–11)

## 2021-01-28 PROCEDURE — 10797: CPT

## 2021-01-28 PROCEDURE — 0BDC8ZX EXTRACTION OF RIGHT UPPER LUNG LOBE, VIA NATURAL OR ARTIFICIAL OPENING ENDOSCOPIC, DIAGNOSTIC: ICD-10-PCS

## 2021-01-28 PROCEDURE — 50010 RENAL EXPLORATION: CPT

## 2021-01-28 PROCEDURE — 0BBC8ZX EXCISION OF RIGHT UPPER LUNG LOBE, VIA NATURAL OR ARTIFICIAL OPENING ENDOSCOPIC, DIAGNOSTIC: ICD-10-PCS | Performed by: INTERNAL MEDICINE

## 2021-01-28 PROCEDURE — 10081 I&D PILONIDAL CYST COMP: CPT

## 2021-01-28 PROCEDURE — 0B9C8ZX DRAINAGE OF RIGHT UPPER LUNG LOBE, VIA NATURAL OR ARTIFICIAL OPENING ENDOSCOPIC, DIAGNOSTIC: ICD-10-PCS

## 2021-01-28 PROCEDURE — 62110: CPT

## 2021-01-28 PROCEDURE — 0W9930Z DRAINAGE OF RIGHT PLEURAL CAVITY WITH DRAINAGE DEVICE, PERCUTANEOUS APPROACH: ICD-10-PCS | Performed by: RADIOLOGY

## 2021-01-28 PROCEDURE — 62900: CPT

## 2021-01-28 NOTE — NUR
PT ARRIVED TO UNIT FROM IR AT APPROX 1220. PT DROWSY, ORIENTED. VSS. O2 SATS
WNL 2L. CHEST TUBE IN PLACE TO -20 SUCTION. PT IN NO RESP DISTRESS. SPOUSE AT
BEDSIDE, SPOUSE HAS ALZHEIMERS, DAUGHTER PRESENT AND VERY INVOLVED WITH CARE.
ADMISSION COMPLETE. TELE STRIP PRINTED AND CHARTED. PT CURRENTLY RESTING IN
BED. DENIES NEEDS. WILL CONT TO MONITOR AND FOLLOW POC.

## 2021-01-29 VITALS — SYSTOLIC BLOOD PRESSURE: 134 MMHG | DIASTOLIC BLOOD PRESSURE: 76 MMHG

## 2021-01-29 VITALS — DIASTOLIC BLOOD PRESSURE: 57 MMHG | SYSTOLIC BLOOD PRESSURE: 119 MMHG

## 2021-01-29 VITALS — SYSTOLIC BLOOD PRESSURE: 135 MMHG | DIASTOLIC BLOOD PRESSURE: 78 MMHG

## 2021-01-29 VITALS — SYSTOLIC BLOOD PRESSURE: 119 MMHG | DIASTOLIC BLOOD PRESSURE: 57 MMHG

## 2021-01-29 VITALS — DIASTOLIC BLOOD PRESSURE: 55 MMHG | SYSTOLIC BLOOD PRESSURE: 118 MMHG

## 2021-01-29 VITALS — SYSTOLIC BLOOD PRESSURE: 117 MMHG | DIASTOLIC BLOOD PRESSURE: 70 MMHG

## 2021-01-29 NOTE — NUR
met with patient and wife at bedside. 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
chest tube. Patient resides at home in independent home with all needs on one
level. He has steps to enter home. Has a walker at home but does not use. He
reports independent with adls and self care. Hoping to dc this weekend. He is
open to home health and no preference for home health agency. Patient cont to
drive. WIfe has some dementia. Dtr from out of town is staying with patient in
home. PCP Dr Nj. Verified address.

## 2021-01-29 NOTE — NUR
FAXED REFERRAL TO [facility]. CONFIRMED WITH [facility] THAT
THEY CAN ACCEPT PATIENT WHEN DISCHARGED. DISCHARGE ORDERS/SUMMARY, CLINICAL
AND THERAPY NOTES FAXED AND RECEIVED AT [facility].
[facility] P [phone]; [phone]

## 2021-01-29 NOTE — NUR
If patient to discharge over weekend [facility] home health is accepting. If
discharged and home health accepting. Call [phone] home health and alert
of discharge. Fax [phone] fax orders

## 2021-01-29 NOTE — NUR
RESTED QUIETLY MOST OF SHIFT. HAD A HARD TIME GETTING COMFORTABLE LAST NOC.
ASSIST TO REPOSITION AS NEEDED. PATIENT CAN MOVE AROUND IN BED BY SELF.
WORKING ON GOALS AND PLAN OF CARE FOR NOC. PROGRESSING TOWARDS DISCHARGE
GOALS SLOWLY. CHEST TUBE RIGHT LATERAL SIDE REMAINS TO -20 SUCTION.
DENIES COMPLAINTS OF SHORTNESS OF AIR OR PAIN. CONTINUE TO ASSES CLOSELY.

## 2021-01-29 NOTE — NUR
RECEIVED PT'S CARE AROUND AROUND 0720; PT. ON BED; ALERT; DURING AM ASSESSMENT
PT. AOX4; NO C/O PAIN DURING BED REST; C/O PAIN WHEN UP WORKING WITH OT; PRN
PAIN MEDICATION GIVEN WITH AM MEDICATIONS; RECEIVED CALL FROM DAUGHTER CLOSE
TO 0800; EDUCATED ABOUT SHIFT CHANGED; ST. "SHIFT CHANGE AT 0800 THAT'S ODD";
INSTRUCTED WRITER WILL RETURN CALL BACK; DAUGHTER AT THE BED SIDE AROUND 0900;
UPDATED ABOUT POC; ST. "YESTERDAY" THE "NURSE LET HER WAIT ON THE WAITING ROOM
WHILE HER MOTHER WAS IN THE ROOM"; EDUCATED ABOUT VISITOR POLICY; "ST. "DAD IT
LOOKS LIKE YESTERDAY YOUR STAFF WAS MORE EMPATHETIC"; WRITER CALL HOUSE
SUPERVISOR; HOUSE SUPERVISOR NOTIFIED; COVID NURSE AT THE BED SIDE EXPLAINED
VISITOR POLICY; PER REPORT CONTACTED PT'S DAUGHTER AND EXPLAINED POLICY;
DESIGNATOR VISITOR PT'S WIFE; UPDATED ON THE SYSTEM; HR ABOVE 100s AFTER
EXERTION; PHYSICIAN NOTIFIED; NO NEW ORDERS; MONITORING; PT. EDUCATED ABOUT
BLOOD SUGAR AND INSULIN; ST. UNDERSTANDING; DURING THE AFTERNOON PT'S DAUGHTER
UPDATED ABOUT PT'S HEALTH STATUS; ST. UNDERSTANDING; ABLE TO VOID 250 ML PLUS
ONE TIME NO MEASURE; BLADDER SCANN SHOWED 65 ML; PASSED ON REPORT; ASSESSMENT
AS CHARGED; FOLLOWING POC; PASSED ON REPORT;

## 2021-01-30 VITALS — DIASTOLIC BLOOD PRESSURE: 79 MMHG | SYSTOLIC BLOOD PRESSURE: 134 MMHG

## 2021-01-30 VITALS — SYSTOLIC BLOOD PRESSURE: 120 MMHG | DIASTOLIC BLOOD PRESSURE: 68 MMHG

## 2021-01-30 VITALS — SYSTOLIC BLOOD PRESSURE: 141 MMHG | DIASTOLIC BLOOD PRESSURE: 81 MMHG

## 2021-01-30 VITALS — SYSTOLIC BLOOD PRESSURE: 119 MMHG | DIASTOLIC BLOOD PRESSURE: 57 MMHG

## 2021-01-30 LAB
ANION GAP SERPL CALC-SCNC: 4 MMOL/L (ref 7–16)
BASOPHILS NFR BLD AUTO: 0.3 % (ref 0–2)
BUN SERPL-MCNC: 30 MG/DL (ref 7–18)
CALCIUM SERPL-MCNC: 8.7 MG/DL (ref 8.5–10.1)
CHLORIDE SERPL-SCNC: 102 MMOL/L (ref 98–107)
CO2 SERPL-SCNC: 31 MMOL/L (ref 21–32)
CREAT SERPL-MCNC: 1.2 MG/DL (ref 0.7–1.3)
EOSINOPHIL NFR BLD: 0.3 % (ref 0–3)
ERYTHROCYTE [DISTWIDTH] IN BLOOD BY AUTOMATED COUNT: 19.1 % (ref 10.5–14.5)
GLUCOSE SERPL-MCNC: 157 MG/DL (ref 74–106)
GRANULOCYTES NFR BLD MANUAL: 85.1 % (ref 36–66)
HCT VFR BLD CALC: 36.5 % (ref 42–52)
HGB BLD-MCNC: 11.3 GM/DL (ref 14–18)
LYMPHOCYTES NFR BLD AUTO: 6.6 % (ref 24–44)
MAGNESIUM SERPL-MCNC: 1.7 MG/DL (ref 1.8–2.4)
MCH RBC QN AUTO: 25.8 PG (ref 26–34)
MCHC RBC AUTO-ENTMCNC: 31.1 G/DL (ref 28–37)
MCV RBC: 82.8 FL (ref 80–100)
MONOCYTES NFR BLD: 7.7 % (ref 1–8)
NEUTROPHILS # BLD: 12.3 THOU/UL (ref 1.4–8.2)
PLATELET # BLD: 427 THOU/UL (ref 150–400)
POTASSIUM SERPL-SCNC: 4.8 MMOL/L (ref 3.5–5.1)
RBC # BLD AUTO: 4.4 MIL/UL (ref 4.5–6)
SODIUM SERPL-SCNC: 137 MMOL/L (ref 136–145)
WBC # BLD AUTO: 14.4 THOU/UL (ref 4–11)

## 2021-01-30 NOTE — NUR
RECEIVED PT'S CARE AROUND 0710; PT. ON BED; ALERT; DURING AM ASSESSMENT AOX4;
FORGETFUL; IRRITABLE; UPSET; ST. HE WANTS TO GO HOME; ST. "THE ONLY THING YOU
DO IS TO GIVE ME PILLS"; EDUCATED ABOUT THE IMPORTANCE OF STAYING IN THE
HOSPITAL DUE CT; NO ANSWER BACK; CALLED DAUGHTER; UPDATED ABOUT POC AND PT'S
FRUSTRATION; ST. UNDERSTANDING; ST. BEING AFRAID DUE TO PT CONFUSION DURING
NIGHT; EDUCATED ABOUT SUN DOWN AND MOOD SWINGS ON OLDER ADULTS DURING STAY
DURING HOSPITAL STAY; ST. UNDERSTANDING; REQUESTED TO TALK WITH PHYSICIANS;
PHYSICIANS NOTIFIED; DR. DRIVER CALL DAUGTHER DURING ROUNDING; PER DR. DRIVER
DAUGHTER REQUESTED HOSPICE; ORDERS ON PLACED; DR. AGUDELO NOTIIFED; DURING
ROUNDING DR. AGUDELO D/C CT;  NOTIFIED; INFORMATION GIVEN; HOSPICE
NURSE CALL; NOTIFIED; ST. UNDERSTANDING; DAUGHTER NOTIFIED ABOUT D/C ORDERS;
ST. UNDERSTANDING; SET UP D/C FOR 1400; AFIB ON THE MONITOR; ASSESSMENT AS
CHARGED; FOLLOWING POC; WILL WORK ON D/C ORDERS;

## 2021-01-30 NOTE — NUR
Assumed pt care at 1900. Pt is alert and oriented. No sign of distress noted
in pt. Denies any pain. Chest tube in place. Fall precaution in place.
Assessment completed and documented. Scheduled meds administered to pt. Pt is
stable through the night with intermittent confusion noted. Pt was easily
re-oriented and went back to sleep. Continue to monitor. No further needs at
this time.

## 2021-01-30 NOTE — NUR
RECEIVED CALL FROM YUMIKO HARTLEY THAT PT HAS DECLINED AND [facility] HAD ALREADY SPOKEN WITH THEM AND PHYSICIANS ALL ARE IN
AGREEMENT WITH DC PLAN HOME WITH [facility]. FAXED REFERRAL SPOKE WITH
SHAMAR IN INTAKE SHE RECEIVED AND WILL ARRANGE DME AND NURSE TO MEET WITH PT.
SPOKE WITH ODILIA CALDERON SHE SAID PT'S DTR (MARCO) WILL TRANSPORT PT HOME I SPOKE
WITH MARCO SHE SAID SHE IS FINE WITH TRANSPORT BUT I REASSURED HER THAT IS SHE
FEELS SHE CANNOT GET PT INTO HOME SAFELY THE NURSE CAN ARRANGE AMBULANCE
TRANSPORT. DC ORDERS NOT READY YET NEED TO BE CHANGED TO HOME WITH HOSPICE
NURSE TO NOTIFY DR. DRIVER. FAX DC ORDERS/SUMMARY [phone] CALL
[phone].

## 2021-02-01 NOTE — PATH
[facility]
[address]
[place]   [address]                     PATHOLOGY RPT PROCEDURE       
_______________________________________________________________________________
 
Name:       DEVON MAXWELL        Room #:         203-P       DIS IN  
M.R.#:      [id number]     Account #:      [id number]  
Admission:  01/29/21    Date of Birth:  [birth year]  
Discharge:  01/30/21                                    Report #:    [id number]
                                                        Path Case #: [id number]
_______________________________________________________________________________
 
LCA Accession Number: [id number]
.                                                                01
Material submitted:                                        .
lung - LUNG, RIGHT UPPER LOBE, BIOPSY FORCEPS. Modifiers: right, upper
.                                                                01
Clinical history:                                          .
LUNG MASS
.                                                                02
**********************************************************************
Diagnosis:
Lung, right upper lobe, biopsy forceps (0844):
- ATYPICAL FRAGMENT WITH DEGENERATIVE CHANGES AS WELL AS ADJACENT FRAGMENT
OF NECROSIS.
- Abundant strips and fragments of benign bronchial epithelium with no
evidence of dysplasia.
.
(IUV:mml; 02/01/2021)
QLM  02/01/2021  1637 Local
**********************************************************************
.                                                                02
Comment:
Single atypical fragment with elongated nuclei as well as degenerative
changes is identified adjacent to fibrinoid degeneration and  apoptosis.
Definitive nuclear features or mitotic figures are not identified due to
the degenerative changes.  The background sample shows strips and
fragments of benign bronchial epithelium with reactive changes.
Properly-controlled immunohistochemical stains are performed on block A1
and show reactivity with Vimentin within the cells of interest; the
fragment is non-reactive to Desmin, p63, TTF-1 as well as AE1/AE3.  The
immunohistochemical stains are inconclusive and this is likely due to the
degenerative changes within the fragment.  It is likely that this fragment
represents tumor; however, lack of reactive immunohistochemical stains
precludes a definitive interpretation.
.
Dr. Chuyita Higuera has seen this case and agrees with my
interpretation.
(IUV:mml; 02/01/2021)
.                                                                02
Electronically signed:                                     .
Edilma Farley MD, Pathologist
NPI- [id number]
.                                                                01
Gross description:                                         .
The specimen is received in formalin, labeled "DONNA Calvin".  Received are several minute fragments of pale tan soft tissue
measuring 0.3 x 0.2 x 0.1 cm in aggregate dimensions.  The specimen is
filtered and entirely submitted in cassette A1.
 
 
[address]   [address]                     PATHOLOGY RPT PROCEDURE       
_______________________________________________________________________________
 
Name:       DEVON MAXWELL        Room #:         203-P       DIS IN  
M.R.#:      [id number]     Account #:      [id number]  
Admission:  01/29/21    Date of Birth:  [birth year]  
Discharge:  01/30/21                                    Report #:    [id number]
                                                        Path Case #: [id number]
_______________________________________________________________________________
(CAA; 1/28/2021)
QAC/QAC  01/28/2021  1714 Local
.                                                                02
Pathologist provided ICD-10:
J98.4, J85.0
.                                                                02
CPT                                                        .
571080, P28006, G06197
Specimen Comment: A courtesy copy of this report has been sent to [phone],
[phone]
Specimen Comment: [phone]
Specimen Comment: Report sent to  / DR AVERY
***Performed at:  01
   [address], [place]  [id number]
   MD Saul Cervantes MD Phone:  [phone]
***Performed at:  02
   [address]  [id number]
   MD Edilma Farley MD Phone:  [phone]

## 2021-02-02 NOTE — PATH
[facility]
[address]
[place]   [address]                     PATHOLOGY RPT PROCEDURE       
_______________________________________________________________________________
 
Name:       DEVON MAXWELL        Room #:         203-P       DIS IN  
M.R.#:      [id number]     Account #:      [id number]  
Admission:  01/29/21    Date of Birth:  [birth year]  
Discharge:  01/30/21                                    Report #:    [id number]
                                                        Path Case #: [id number]
_______________________________________________________________________________
Note
LCA Accession Number: [id number]
   TESTS               RESULT  FLAG  UNITS    REF RANGE  LAB
------------------------------------------------------------
   Clinician Provided Cytology Information
   No. of containers..01 Other (Miscellaneous)
Source:                                                   01
   RUL BAL
DIAGNOSIS:                                                02
   RUL BAL
   INCONCLUSIVE.
   REACTIVE BRONCHIAL CELLS ARE PRESENT.
   THIS INTERPRETATION INCLUDES EVALUATION OF A CELL BLOCK.
   SCANT TO RARE ATYPICAL CELLS WITH NUCLEAR MOULDING.
   Comment:
   Scant to rare single atypical cells are identified with nuclear moulding.
   Their scant nature limits a definite interpretation.
Pathologist ICD10:                                        02
   R91.8
Signed out by:                                            Pearl Farley MD, Pathologist
   NPI- [id number]
Performed by:                                             Edwar Gracia, Cytotechnologist ([facility])
Gross description:                                        01
   15 ML, RED, 1TP 1CB
   /LCS  01/29/2021  0715 Local
 
------------------------------------------------------------
    FLAG LEGEND:
    L-Low Normal,H-High Normal,LL-Alert Low,HH-Alert High
    <-Panic Low,>-Panic High,A-Abnormal,AA-Critical Abnormal
------------------------------------------------------------
 
Performed at:
01 [facility]
   [address]
   [place]  [address]
   Saul Cervantes MD, Phone: [phone]
02 [address]  [address]
   Edilma Farley MD, Phone: [phone]
Specimen Comment: A courtesy copy of this report has been sent to [phone],
[phone]
Specimen Comment: [phone]
Specimen Comment: Report sent to  / DR HSUSEIN
***Performed at:  01
   [facility]
[address] [place]   [address]                     PATHOLOGY RPT PROCEDURE       
_______________________________________________________________________________
 
Name:       DEVON MAXWELL        Room #:         203-P       DIS IN  
M.R.#:      [id number]     Account #:      [id number]  
Admission:  01/29/21    Date of Birth:  [birth year]  
Discharge:  01/30/21                                    Report #:    [id number]
                                                        Path Case #: [id number]
_______________________________________________________________________________
   [address], [place], [place]  [id number]
   MD Saul Cervantes MD Phone:  [phone]

## 2021-02-02 NOTE — PATH
[facility]
[address] [place]   [address]                     PATHOLOGY RPT PROCEDURE       
_______________________________________________________________________________
 
Name:       DEVON MAXWELL        Room #:         203-P       DIS IN  
M.R.#:      [id number]     Account #:      [id number]  
Admission:  01/29/21    Date of Birth:  [birth year]  
Discharge:  01/30/21                                    Report #:    [id number]
                                                        Path Case #: [id number]
_______________________________________________________________________________
Note
[facility] Accession Number: [id number]
   TESTS               RESULT  FLAG  UNITS    REF RANGE  LAB
------------------------------------------------------------
   Clinician Provided Cytology Information
   No. of containers..01 Other (Miscellaneous)
Source:                                                   01
   RUL BRUSHING
DIAGNOSIS:                                                02
   RUL BRUSHING
   INCONCLUSIVE.
   SCANT CELLULARITY.
   SPECIMEN CONSISTS OF BLOOD.
   THIS INTERPRETATION INCLUDES EVALUATION OF A CELL BLOCK.
   SCANT TO RARE GROUPS OF ATYPICAL CELLS.
   Comment:
   Scattered rare groups of atypical cells with scant cytoplasm are present.
   Moulded nuclei are identified. The specimen is scantly cellular and is
   predominantly comprised of blood. The concurrent biopsy showed a single
   atypical fragment with necrosis ([id number]). Immunohistochemical stains
   were attempted on the fragment; however, showed no definite reactivity
   and the stains were inconclusive. The RUL TBNA, [id number] showed
   neoplastic cells and is pending further studies at the time of this report.
   Please refer to separate reports for details.
Pathologist ICD10:                                        02
   R91.8
Signed out by:                                            02
   Edilma Farley MD, Pathologist
   NPI- [id number]
Performed by:                                             01
   Amarilis Gracia, Cytotechnologist ([facility])
Gross description:                                        01
   16ML, RED, 1TP 1CB
   /LCS  01/29/2021  0712 Local
 
------------------------------------------------------------
    FLAG LEGEND:
    L-Low Normal,H-High Normal,LL-Alert Low,HH-Alert High
    <-Panic Low,>-Panic High,A-Abnormal,AA-Critical Abnormal
------------------------------------------------------------
 
Performed at:
01 [facility]
   [address] [place] [address] [address]
   [place]  [address]
   Saul Cervantes MD, Phone: [phone]
02 [facility]  [address]
 
 
[facility]
[address] [place]   [address]                     PATHOLOGY RPT PROCEDURE       
_______________________________________________________________________________
 
Name:       DEVNO MAXWELL        Room #:         203-P       DIS IN  
M.R.#:      [id number]     Account #:      [id number]  
Admission:  01/29/21    Date of Birth:  [birth year]  
Discharge:  01/30/21                                    Report #:    [id number]
                                                        Path Case #: [id number]
_______________________________________________________________________________
   Edilma Farley MD, Phone: [phone]
Specimen Comment: A courtesy copy of this report has been sent to [phone]
[phone]
Specimen Comment: [phone]
Specimen Comment: Report sent to  / DR HUSSEIN
***Performed at:  01
   [facility]
   [address], [place], [place]  [id number]
   MD Saul Cervantes MD Phone:  [phone]

## 2021-02-03 NOTE — PATH
[facility]
[address]
[place]   [address]                     PATHOLOGY RPT PROCEDURE       
_______________________________________________________________________________
 
Name:       DEVON MAXWELL        Room #:         203-P       DIS IN  
M.R.#:      [id number]     Account #:      [id number]  
Admission:  01/29/21    Date of Birth:  [birth year]  
Discharge:  01/30/21                                    Report #:    [id number]
                                                        Path Case #: [id number]
_______________________________________________________________________________
Note
[facility] Accession Number: [id number]
   TESTS               RESULT  FLAG  UNITS    REF RANGE  LAB
------------------------------------------------------------
   Clinician Provided Cytology Information
   No. of containers..01 Other (Miscellaneous)
Source:                        [A]                        01
   RUL FNA
DIAGNOSIS:                     [A]                        02
   RUL TBNA/ FNA BIOPSY
   POSITIVE FOR MALIGNANT CELLS.
   THIS INTERPRETATION INCLUDES EVALUATION OF A CELL BLOCK.
   POORLY DIFFERENTIATED MALIGNANT NEOPLASM, SEE COMMENT.
   Comment:
   Examinations shows a high grade neoplasm with marked pleomorphism and
   large nuclei. Nucleoli or cytoplasmic borders are not evident. Properly
   controlled immunohsitochemical stains are performed and the lesion shows
   reactivity with CD56 only. Remaining immunohistochemical stains are
   non-reactive and included AE1/AE3, TTF1, Synaptophysin, Chromogranin, p63
   and CD45.
   Based on the reactive marker this lesion may either represent a high grade
   poolry differentiated small cell carcinoma (showing no reacitivity with
   TTF1, AE1/AE3, synaptophysin and chromogranin) or a sarcoma with CD56
   reactivity. Lack of reactivity is likely due to the poorly differentiated
   nature of the neoplasm. Additional stains are ordered and those will be
   reported in an addendum to follow.
   The lesion sampled is large (10 cm) and therefore the sample may not be
   entirely representative. Clinical correlation is required.
 
   [place]: Dr. Xu Ariza and Dr. Saul Cervantes
   Findings were discussed with Dr. Freddie Petersen at [facility] 2 pm on
   2/2/21.
Pathologist ICD10:                                        02
   C34.91
Addendum:                                                 02
   This addendum is issued subsequent to reviewing additional properly
   controlled immunohistochemical stains performed on the cell block of this
   case.  The immunohistochemical stains are interpreted as follows:
   CD99 - Weak membranous reactivity present
   DESMIN - Less than 1% reactivity present
   S100 - Non-reactive
   MART-1/Melanin A - Non-reactive
   .
   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
   differentiated/high-grade sarcomatoid neoplasm.  The differential
   diagnosis includes synovial sarcoma, as well as leiomyosarcoma in addition
   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
 
 
[address]   [address]                     PATHOLOGY RPT PROCEDURE       
_______________________________________________________________________________
 
Name:       ALISSADEVON RICARDO        Room #:         203-P       DIS IN  
M.R.#:      [id number]     Account #:      [id number]  
Admission:  01/29/21    Date of Birth:  [birth year]  
Discharge:  01/30/21                                    Report #:    [id number]
                                                        Path Case #: [id number]
_______________________________________________________________________________
   neuroendocrine markers and focal reactivity with CD56.  The non-reactive
   immunohistochemical stains argue against a melanoma or a malignant
   peripheral nerve sheath tumor.
   (IUV:pit 02/03/2021)
   .
   Professional services performed by [facility] at [facility],
   [address] [place], [place] [address], [place] [address].   Technical services performed
   by [facility] at [address] [place] [address], [address], [place] [address].
   [facility]/02/03/2021
   Addendum Electronically Signed by Edilma Farley MD, Pathologist
Signed out by:                                            02
   Edilma Farley MD, Pathologist
   NPI- [id number]
Performed by:                                             01
   Mattie Espinal, Cytotechnologist ([facility])
Gross description:                                        01
   20ML, RED, 1CB
   /LCS  01/29/2021  0910 Local
 
------------------------------------------------------------
    FLAG LEGEND:
    L-Low Normal,H-High Normal,LL-Alert Low,HH-Alert High
    <-Panic Low,>-Panic High,A-Abnormal,AA-Critical Abnormal
------------------------------------------------------------
 
Performed at:
01 [address]
   [place]  [address]
   Saul Cervantes MD, Phone: [phone]
02 [facility]
   [address], [place]  [address]
   Edilma Farley MD, Phone: [phone]
Specimen Comment: A courtesy copy of this report has been sent to [phone]
[phone]
Specimen Comment: [phone]
Specimen Comment: Report sent to  / DR AVERY
***Performed at:  01
   [address], [place], [place]  [id number]
   MD Saul Cervantes MD Phone:  [phone]